# Patient Record
Sex: FEMALE | Race: WHITE | HISPANIC OR LATINO | URBAN - METROPOLITAN AREA
[De-identification: names, ages, dates, MRNs, and addresses within clinical notes are randomized per-mention and may not be internally consistent; named-entity substitution may affect disease eponyms.]

---

## 2018-07-24 ENCOUNTER — APPOINTMENT (RX ONLY)
Dept: URBAN - METROPOLITAN AREA CLINIC 22 | Facility: CLINIC | Age: 48
Setting detail: DERMATOLOGY
End: 2018-07-24

## 2018-07-24 PROBLEM — S53.21XA: Status: ACTIVE | Noted: 2018-07-24

## 2018-07-24 PROCEDURE — ? PHYSICAL THERAPY RX

## 2018-07-24 PROCEDURE — ? OBSERVATION

## 2018-07-24 PROCEDURE — 99243 OFF/OP CNSLTJ NEW/EST LOW 30: CPT

## 2018-07-24 NOTE — PROCEDURE: OBSERVATION
X Size Of Lesion In Cm (Optional): 0
Instructions (Optional): Pt to begin hand therapy at [facility] \\nReturn in 3 weeks
Detail Level: Simple

## 2018-07-24 NOTE — HPI: HAND (HAND CONTUSION)
How Severe Is Your Pain?: mild
Is This A New Presentation, Or A Follow-Up?: Hand Contusion
Are You Right-Handed, Left-Handed, Or Ambidextrous?: right hand dominant
Name Of Er?: [facility]

## 2018-08-16 ENCOUNTER — APPOINTMENT (RX ONLY)
Dept: URBAN - METROPOLITAN AREA CLINIC 11 | Facility: CLINIC | Age: 48
Setting detail: DERMATOLOGY
End: 2018-08-16

## 2018-08-16 PROBLEM — S53.21XD: Status: ACTIVE | Noted: 2018-08-16

## 2018-08-16 PROCEDURE — 99213 OFFICE O/P EST LOW 20 MIN: CPT

## 2018-08-16 PROCEDURE — ? OBSERVATION

## 2018-08-16 NOTE — PROCEDURE: OBSERVATION
Instructions (Optional): Patient will return for follow up in 6 weeks for further evaluation\\npatint has been instructed on ROM and wieght bearing with resitence not o exceed 20 lbs
Detail Level: Simple
Size Of Lesion In Cm (Optional): 0

## 2018-08-16 NOTE — HPI: HAND (HAND CONTUSION)
How Severe Is Your Pain?: mild
Is This A New Presentation, Or A Follow-Up?: Follow Up Hand Contusion
Since Your Previous Visit, Your Hand Contusion Is:: stable
Are You Right-Handed, Left-Handed, Or Ambidextrous?: right hand dominant
Name Of Er?: [facility]

## 2018-09-25 ENCOUNTER — APPOINTMENT (RX ONLY)
Dept: URBAN - METROPOLITAN AREA CLINIC 11 | Facility: CLINIC | Age: 48
Setting detail: DERMATOLOGY
End: 2018-09-25

## 2018-09-25 PROBLEM — S53.21XD: Status: ACTIVE | Noted: 2018-09-25

## 2018-09-25 PROCEDURE — ? OBSERVATION

## 2018-09-25 PROCEDURE — ? RETURN TO WORK

## 2018-09-25 PROCEDURE — ? DIAGNOSIS COMMENT

## 2018-09-25 PROCEDURE — 99213 OFFICE O/P EST LOW 20 MIN: CPT

## 2018-09-25 NOTE — PROCEDURE: OBSERVATION
X Size Of Lesion In Cm (Optional): 0
Detail Level: Simple
Instructions (Optional): Patient to discontinue physical therapy after last session this week, follow up in 3 months. Instructed to use hand as much as possible